# Patient Record
Sex: FEMALE | Race: WHITE
[De-identification: names, ages, dates, MRNs, and addresses within clinical notes are randomized per-mention and may not be internally consistent; named-entity substitution may affect disease eponyms.]

---

## 2018-12-27 ENCOUNTER — HOSPITAL ENCOUNTER (OUTPATIENT)
Dept: HOSPITAL 47 - RADMAMWWP | Age: 38
Discharge: HOME | End: 2018-12-27
Attending: INTERNAL MEDICINE
Payer: COMMERCIAL

## 2018-12-27 DIAGNOSIS — Z12.31: Primary | ICD-10-CM

## 2018-12-27 PROCEDURE — 77067 SCR MAMMO BI INCL CAD: CPT

## 2018-12-27 PROCEDURE — 77063 BREAST TOMOSYNTHESIS BI: CPT

## 2018-12-31 NOTE — MM
Reason for exam: screening  (asymptomatic).

Last mammogram was performed 2 years and 5 months ago.



History:

Family history of prostate cancer in father.



Physical Findings:

A clinical breast exam by your physician is recommended on an annual basis and 

results should be correlated with mammographic findings.



MG 3D Screening Mammo W/Cad

Bilateral CC and MLO view(s) were taken.

Prior study comparison: July 12, 2016, bilateral MG 3d screening mammo w/cad.

The breast tissue is heterogeneously dense. This may lower the sensitivity of 

mammography.  No significant changes when compared with prior studies.





ASSESSMENT: Benign, BI-RAD 2



RECOMMENDATION:

Routine screening mammogram of both breasts in 1 year.

## 2020-07-15 ENCOUNTER — HOSPITAL ENCOUNTER (OUTPATIENT)
Dept: HOSPITAL 47 - RADMAMWWP | Age: 40
Discharge: HOME | End: 2020-07-15
Attending: INTERNAL MEDICINE
Payer: COMMERCIAL

## 2020-07-15 DIAGNOSIS — Z12.31: Primary | ICD-10-CM

## 2020-07-15 PROCEDURE — 77067 SCR MAMMO BI INCL CAD: CPT

## 2020-07-15 PROCEDURE — 77063 BREAST TOMOSYNTHESIS BI: CPT

## 2020-07-16 NOTE — MM
Reason for exam: screening  (asymptomatic).

Last mammogram was performed 1 year and 7 months ago.



History:

Family history of prostate cancer in father.



Physical Findings:

A clinical breast exam by your physician is recommended on an annual basis and 

results should be correlated with mammographic findings.



MG 3D Screening Mammo W/Cad

Bilateral CC and MLO view(s) were taken.

Prior study comparison: December 27, 2018, bilateral MG 3d screening mammo w/cad. 

July 12, 2016, bilateral MG 3d screening mammo w/cad.

The breast tissue is heterogeneously dense. This may lower the sensitivity of 

mammography.  No significant changes when compared with prior studies.





ASSESSMENT: Negative, BI-RAD 1



RECOMMENDATION:

Routine screening mammogram of both breasts in 1 year.

## 2021-03-16 ENCOUNTER — HOSPITAL ENCOUNTER (OUTPATIENT)
Dept: HOSPITAL 47 - LABPAT | Age: 41
Discharge: HOME | End: 2021-03-16
Attending: OBSTETRICS & GYNECOLOGY
Payer: COMMERCIAL

## 2021-03-16 DIAGNOSIS — Z01.812: Primary | ICD-10-CM

## 2021-03-16 DIAGNOSIS — N92.1: ICD-10-CM

## 2021-03-16 LAB
BASOPHILS # BLD AUTO: 0 K/UL (ref 0–0.2)
BASOPHILS NFR BLD AUTO: 1 %
EOSINOPHIL # BLD AUTO: 0.2 K/UL (ref 0–0.7)
EOSINOPHIL NFR BLD AUTO: 3 %
ERYTHROCYTE [DISTWIDTH] IN BLOOD BY AUTOMATED COUNT: 4.52 M/UL (ref 3.8–5.4)
ERYTHROCYTE [DISTWIDTH] IN BLOOD: 14.8 % (ref 11.5–15.5)
HCT VFR BLD AUTO: 40.7 % (ref 34–46)
HGB BLD-MCNC: 13.3 GM/DL (ref 11.4–16)
LYMPHOCYTES # SPEC AUTO: 1.7 K/UL (ref 1–4.8)
LYMPHOCYTES NFR SPEC AUTO: 35 %
MCH RBC QN AUTO: 29.4 PG (ref 25–35)
MCHC RBC AUTO-ENTMCNC: 32.7 G/DL (ref 31–37)
MCV RBC AUTO: 89.9 FL (ref 80–100)
MONOCYTES # BLD AUTO: 0.3 K/UL (ref 0–1)
MONOCYTES NFR BLD AUTO: 6 %
NEUTROPHILS # BLD AUTO: 2.5 K/UL (ref 1.3–7.7)
NEUTROPHILS NFR BLD AUTO: 53 %
PLATELET # BLD AUTO: 235 K/UL (ref 150–450)
WBC # BLD AUTO: 4.7 K/UL (ref 3.8–10.6)

## 2021-03-16 PROCEDURE — 85025 COMPLETE CBC W/AUTO DIFF WBC: CPT

## 2021-03-16 PROCEDURE — 36415 COLL VENOUS BLD VENIPUNCTURE: CPT

## 2021-03-25 NOTE — HP
HISTORY AND PHYSICAL



DATE OF SURGERY:

2021



HISTORY OF PRESENT ILLNESS:

The patient is a 40-year-old  4, para 3-0-1-3, who presented to the office

complaining of long-standing heavy and very lengthy cycles lasting as long as 10 days.

She does occasionally bleed through her protection.  Her  has had vasectomy and

she is interested in proceeding with NovaSure endometrial ablation.



PAST MEDICAL HISTORY:

Significant for pregnancy-induced hypertension without hypertension outside of

pregnancy.  She additionally has menorrhagia as noted above.



SURGICAL HISTORY:

She has a history of a D and C in  for miscarriage and then she has also had a

surgery on her left arm from an orthopedic perspective.  There were no anesthetic

concerns.



OBSTETRICAL HISTORY:

 4, para 3-0-1-3, with 3 term vaginal deliveries without complications aside

from pregnancy-induced hypertension as noted above.  She additionally had an 18-week

intrauterine fetal demise which delivered vaginally but required D and C for retained

placenta.  Method of contraception is vasectomy.



GYNECOLOGIC HISTORY:

Unremarkable, with no history of any infections to include STDs.



FAMILY HISTORY:

Noncontributory.



SOCIAL HISTORY:

The patient is  and self-employed as a .  She is a nonsmoker and

reports occasional alcohol.  No other social concerns.



CURRENT MEDICATIONS:

None.



ALLERGIES:

She has hallucinations with CODEINE by report and had hives with MORPHINE.



REVIEW OF SYSTEMS:

Confined to history of present illness.



PHYSICAL EXAMINATION:

Vital signs are stable.  The patient is afebrile.  In general, this is a well-

developed, well-nourished white female in no acute distress.  Her heart has a regular

rhythm and rate without murmur.  Her lungs are clear to auscultation bilaterally in all

fields.  Her abdomen is nondistended, has normoactive bowel sounds, is soft, nontender

and without any palpable masses aside from the uterine fundus.  Her extremities are

without any cyanosis, clubbing or edema and are nontender to palpation bilaterally.

Pelvic examination demonstrates normal external genitalia and BUS with normal vaginal

mucosa and cervix, though there was at that time a 2 cm endocervical polyp noted on the

cervix, which was removed with a ring forceps, though the base may still be present.

It was broadly attached at approximately 7 o'clock to 8 o'clock on the uterine cervix.

The uterus itself is anteverted, approximately 6 weeks in size, mobile, nontender and

normal in shape.  The adnexa are normal and nontender without mass bilaterally.



ASSESSMENT AND PLAN:

Menometrorrhagia:  We discussed options for treatment, and the patient has requested

diagnostic hysteroscopy with D and C and NovaSure endometrial ablation.  The risks and

complications of the procedure have been thoroughly discussed, including risk for

bleeding, bleeding requiring transfusion, infection, and injury to local structures to

include uterine perforation, Asherman syndrome and subsequent hematometra.  She has

understood all this and agreed to proceed.  We may in addition need to excise the base

of the endocervical polyp, should it appear to be still present.





MMODL / IJN: [id number] / Job#: [id number]

## 2021-03-29 ENCOUNTER — HOSPITAL ENCOUNTER (OUTPATIENT)
Dept: HOSPITAL 47 - OR | Age: 41
Discharge: HOME | End: 2021-03-29
Attending: OBSTETRICS & GYNECOLOGY
Payer: COMMERCIAL

## 2021-03-29 VITALS — BODY MASS INDEX: 22.8 KG/M2

## 2021-03-29 VITALS — DIASTOLIC BLOOD PRESSURE: 72 MMHG | HEART RATE: 52 BPM | SYSTOLIC BLOOD PRESSURE: 116 MMHG

## 2021-03-29 VITALS — TEMPERATURE: 97 F | RESPIRATION RATE: 16 BRPM

## 2021-03-29 DIAGNOSIS — Z88.5: ICD-10-CM

## 2021-03-29 DIAGNOSIS — N92.1: Primary | ICD-10-CM

## 2021-03-29 PROCEDURE — 81025 URINE PREGNANCY TEST: CPT

## 2021-03-29 PROCEDURE — 58563 HYSTEROSCOPY ABLATION: CPT

## 2021-03-29 PROCEDURE — 88305 TISSUE EXAM BY PATHOLOGIST: CPT

## 2021-03-29 NOTE — P.OP
Date of Procedure: 03/29/21


Preoperative Diagnosis: 


#1.  Menometrorrhagia


Postoperative Diagnosis: 


Same


Procedure(s) Performed: 


#1.  Diagnostic hysteroscopy #2.  Dilation and curettage #3.  NovaSure 

endometrial ablation


Anesthesia: other (Gen. by LMA)


Surgeon: Toni Aleman


Estimated Blood Loss (ml): 5


IV fluids (ml): 200


Urine output (ml): 300


Pathology: other (Endometrial curettings)


Condition: stable


Disposition: PACU


Operative Findings: 


Preoperative pelvic examination demonstrated a roughly 5-6 week midplane mobile 

normal shaped uterus with normal adnexa bilaterally.  The site of the previously

removed endocervical polyp did not appear to have any base left to be excised 

and was left alone.  The uterus sounded to 9 cm with a cervical length of 

approximately 3 cm.  The hysteroscope was utilized and both tubal ostia were 

seen.  There was a small amount of shaggy endometrial tissue throughout which 

was removed during curettage at which time a small to moderate amount of tissue 

was noted.  The settings for the NovaSure tool where a length of 6.0 cm, a width

of 4.6 cm for a total power 152 W.  After total run time of 78 seconds, the base

unit read "procedure complete."  The postprocedural hysteroscopic result 

appeared to be excellent.  The patient is a borderline to poor candidate for 

vaginal hysterectomy should it become necessary.


Description of Procedure: 


The patient was prepped and draped in usual fashion after general anesthesia was

administered by the anesthesiologist.  A weighted speculum was placed in the 

bladder draining approximately 300 mL of clear marcus urine.  The anterior lip of

the cervix was grasped with a single-tooth tenaculum and uterus sounded to 9 cm 

with cervical length of approximately 3 cm.  Serial dilation was carried out to 

admit the diagnostic hysteroscope which was placed in the major cavity distended

with saline.  The bilateral tubal ostia were seen.  There was a small to 

moderate amount of shaggy tissue primarily on the anterior wall of the uterus.  

No pathology was noted.  The scope was then set aside in a Telfa placed in the 

vagina.  A small to medium sharp curette was utilized to thoroughly and 

circumferentially curet the endometrial contents onto the Telfa in the vagina.  

A small to moderate amount of tissue was noted.  The typical gritty texture was 

encountered throughout.  After adequate curettage, the NovaSure tool was placed 

into the endometrial cavity, opened, and seated well.  The settings were a 

length of 6.0 cm, a width of 4.6 cm for a total power 152 W.  The cavity check 

was attempted and passed without difficulty and the tool was enabled.  After a 

run time of 78 seconds, the base unit read "procedure complete."  The tool was 

closed, removed, and discarded.  The hysteroscope was replaced and the result 

appeared to be excellent.  All instrumentation was then removed.  There was some

ongoing bleeding at the tenaculum sites which were made hemostatic with 

pressure.  Assessment a blood loss for the entire case was less than 5 mL.  

There were no complications.  All sponge, instrument, and needle counts were 

correct.  The patient tolerated the procedure well and proceeded to the recovery

room in stable condition.  The patient is a relatively poor candidate for 

vaginal hysterectomy and would likely benefit from a robotic procedure should 

hysterectomy become necessary in the future.

## 2024-12-26 ENCOUNTER — HOSPITAL ENCOUNTER (OUTPATIENT)
Dept: HOSPITAL 47 - RADMAMWWP | Age: 44
Discharge: HOME | End: 2024-12-26
Attending: OBSTETRICS & GYNECOLOGY
Payer: COMMERCIAL

## 2024-12-26 DIAGNOSIS — Z12.31: Primary | ICD-10-CM

## 2024-12-26 DIAGNOSIS — R92.333: ICD-10-CM

## 2024-12-26 PROCEDURE — 77067 SCR MAMMO BI INCL CAD: CPT

## 2024-12-26 PROCEDURE — 77063 BREAST TOMOSYNTHESIS BI: CPT

## 2024-12-27 NOTE — MM
Reason for Exam: Screening  (asymptomatic). 

Last mammogram was performed 4 year(s) and 5 month(s) ago. 





Patient History: 

Menarche at age 11. First Full-Term Pregnancy at age 22.

Father had prostate cancer. 





Risk Values: 

Silvia 5 year model risk: 0.8%.

NCI Lifetime model risk: 8.5%.





Prior Study Comparison: 

7/12/2016 Bilateral Screening Mammogram, [facility]. 12/27/2018 Bilateral Screening Mammogram, [facility].

7/15/2020 Bilateral Screening Mammogram, [facility]. 





Tissue Density: 

The breasts are heterogeneously dense, which may obscure small masses.





Findings: 

Analyzed By CAD. 

Unchanged global asymmetry lateral posterior right breast. Other areas of asymmetric density are

also unchanged.

There is no suspicious group of microcalcifications or new suspicious mass in either breast. 





Overall Assessment: Benign, BI-RAD 2





Management: 

Screening Mammogram of both breasts in 1 year.

.



Patient should continue monthly self-breast exams.  A clinical breast exam by your physician is

recommended on an annual basis.

This exam should not preclude additional follow-up of suspicious palpable abnormalities.



Note on Silvia scores and lifetime risk:

1. A Silvia score greater than 3% is considered moderate risk. If this is the case, consider

specialist referral to assess eligibility for a risk reducing agent.

2. If overall lifetime risk for the development of breast cancer is 20% or higher, the patient may

qualify for future screening with alternating mammogram and breast MRI.









[facility], Workstation: [id number], 12/27/2024 5:23 PM.



Electronically signed and approved by: Yaya Rayo M.D. Radiologist